# Patient Record
Sex: MALE
[De-identification: names, ages, dates, MRNs, and addresses within clinical notes are randomized per-mention and may not be internally consistent; named-entity substitution may affect disease eponyms.]

---

## 2018-02-28 ENCOUNTER — HOSPITAL ENCOUNTER (OUTPATIENT)
Dept: HOSPITAL 5 - OR | Age: 53
Discharge: HOME | End: 2018-02-28
Attending: SURGERY
Payer: MEDICARE

## 2018-02-28 VITALS — SYSTOLIC BLOOD PRESSURE: 130 MMHG | DIASTOLIC BLOOD PRESSURE: 81 MMHG

## 2018-02-28 DIAGNOSIS — Z79.899: ICD-10-CM

## 2018-02-28 DIAGNOSIS — Z87.891: ICD-10-CM

## 2018-02-28 DIAGNOSIS — E11.9: ICD-10-CM

## 2018-02-28 DIAGNOSIS — D17.1: Primary | ICD-10-CM

## 2018-02-28 PROCEDURE — 88304 TISSUE EXAM BY PATHOLOGIST: CPT

## 2018-02-28 PROCEDURE — 82962 GLUCOSE BLOOD TEST: CPT

## 2018-02-28 NOTE — SHORT STAY SUMMARY
Short Stay Documentation


Date of service: 02/28/18





- History


H&P: obtained from office





- Allergies and Medications


Current Medications: 


 Allergies





No Known Allergies Allergy (Verified 09/26/16 02:35)


 





 Home Medications











 Medication  Instructions  Recorded  Confirmed  Last Taken  Type


 


FLUoxetine [PROzac] 20 mg PO QDAY 02/23/18 02/28/18 02/28/18 04:00 History


 


Zolpidem [Ambien] 5 mg PO QHS PRN 02/23/18 02/28/18 02/27/18 21:00 History


 


traZODone [Desyrel] 200 mg PO QHS 02/23/18 02/28/18 02/27/18 21:00 History














- Brief post op/procedure progress note


Date of procedure: 02/28/18


Pre-op diagnosis: Right chest wall lipoma of the upper inner quadrant


Post-op diagnosis: same


Procedure: 





Right chest wall excisional biopsy


Anesthesia: GETA


Findings: 





Probable right chest wall lipoma of 3 cm excised in its entirety


Surgeon: SULLY ORTEGA


Estimated blood loss: minimal


Pathology: list (right probable chest wall lipoma)


Specimen disposition: to lab


Condition: stable





- Disposition


Condition at discharge: Good


Disposition: DC-01 TO HOME OR SELFCARE





Short Stay Discharge Plan


Activity: other (no heavy lifting)


Diet: regular


Wound: other (keep incision clean and dry and may shower in 24 hours)


Follow up with: 


JUAN PABLO LAURA MD [Primary Care Provider] - 7 Days


SULLY ORTEGA MD [Staff Physician] - 7 Days

## 2018-02-28 NOTE — OPERATIVE REPORT
Operative Report


Operative Report: 





Date of Service: February 28, 2018





Preoperative diagnosis: Probable right chest wall lipoma the upper inner 

quadrant





Postoperative diagnosis: Same





Procedure: Right chest wall excisional biopsy of probable lipoma





Surgeon: Marine Verma M.D.





Anesthesia: Local





Findings: Probable right chest wall lipoma at 3 o'clock position about 6-8 cm 

from the nipple of 3 cm completely excised in its entirety





Complications: None





Drains: None





Disposition: PACU in good condition





Indications for operative procedure: This is a 52-year-old gentleman with a 

probable right chest wall lipoma with symptomatic pain.  Patient wanted to 

proceed with excisional biopsy.  He wished to proceed with the above procedure.





Procedure in detail: Patient was taking to minor procedure room.  The right 

chest wall was shaved.  The right chest wall was then prepped and draped in the 

normal sterile operative fashion.  Probable right chest wall lipoma was 

palpable at the 3 o'clock position 6 cm from the nipple.  Timeout was 

performed. The skin was anesthesized with 1% lidocaine mixed with quarter 

percent marcaine.  A 3:00  radial skin incision was made with a 15 blade knife 

and dissection taken down to the subcutaneous tissue.  The lipoma was 

encountered and was appropriately dissected free with the aid of the Bovie 

cautery.  The lipoma was removed in its entirety.  Hemostasis was obtained with 

the Bovie cautery.  The biopsy site was irrigated.  The subcutaneous tissues 

were approximated and closed using interrupted 3-0 Vicryl and skin brought 

together and closed using a running 4-0 Monocryl followed by skin affix.  He 

tolerated the procedure very well and was transferred to PACU in good condition.